# Patient Record
Sex: FEMALE | URBAN - METROPOLITAN AREA
[De-identification: names, ages, dates, MRNs, and addresses within clinical notes are randomized per-mention and may not be internally consistent; named-entity substitution may affect disease eponyms.]

---

## 2023-03-10 ENCOUNTER — MEDICAL CORRESPONDENCE (OUTPATIENT)
Dept: HEALTH INFORMATION MANAGEMENT | Facility: CLINIC | Age: 58
End: 2023-03-10
Payer: COMMERCIAL

## 2023-03-20 ENCOUNTER — TRANSCRIBE ORDERS (OUTPATIENT)
Dept: OTHER | Age: 58
End: 2023-03-20

## 2023-03-20 DIAGNOSIS — H53.451 DECREASED PERIPHERAL VISION OF RIGHT EYE: Primary | ICD-10-CM

## 2023-03-21 ENCOUNTER — TELEPHONE (OUTPATIENT)
Dept: OPHTHALMOLOGY | Facility: CLINIC | Age: 58
End: 2023-03-21
Payer: COMMERCIAL

## 2023-03-21 NOTE — TELEPHONE ENCOUNTER
Eliud and PATRICIA Romeo can make an appointment with Dr. Ramirez for pelican prism consult     Kay Romero Communication Facilitator on 3/21/2023 at 5:12 PM

## 2023-03-21 NOTE — TELEPHONE ENCOUNTER
Ok for low vision (60 minute exam) with Dr. Ramirez for Peli prism exam    Note to patient communicator to reach out for scheduling    Eduardo Bonilla RN 2:10 PM 03/21/23            [facility] Call Center    Phone Message    May a detailed message be left on voicemail: yes     Reason for Call: Appointment Intake    Referring Provider Name: Michelle Jimenez MD in GENERIC EXTERNAL DATA DEPARTMENT  Diagnosis and/or Symptoms: Neuro Ophth- Dr Ramirez for [place] Prism Lense Consult  Decreased peripheral vision of right eye [H53.451]    I wasn't sure if this was for prism testing? Protocols say to send TE others not in Protocol, James not ine Neuro-Ophthalmology     Thank you,    Action Taken: Message routed to:  [facility] Eye    Travel Screening: Not Applicable

## 2023-03-22 ENCOUNTER — TELEPHONE (OUTPATIENT)
Dept: OPHTHALMOLOGY | Facility: CLINIC | Age: 58
End: 2023-03-22
Payer: COMMERCIAL

## 2023-03-22 NOTE — TELEPHONE ENCOUNTER
Attempted to call x 2 and left 2 vm     Agueda can make an appointment with Dr. Ramirez for prism     Kay Romero Communication Facilitator on 3/22/2023 at 10:22 AM

## 2023-04-20 NOTE — TELEPHONE ENCOUNTER
Health Call Center    Phone Message    May a detailed message be left on voicemail: yes     Reason for Call: Other: Patient calling back to clarify if Dr Ramirez can see her for pelican prism. Patient wants us to kow that she already has prism glasses. Please call patient back at [phone]. Thank you.     Action Taken: Message routed to:  [facility] Eye    Travel Screening: Not Applicable

## 2023-04-21 ENCOUNTER — TELEPHONE (OUTPATIENT)
Dept: OPHTHALMOLOGY | Facility: CLINIC | Age: 58
End: 2023-04-21
Payer: COMMERCIAL

## 2023-04-21 NOTE — TELEPHONE ENCOUNTER
Spoke to pt at 1015    Pt with h/o diplopia and wears prism in glasses    Pt also states right hemiopsia and interested in peli prisms    After review offered appt with Dr. Ramirez    Scheduled with Dr. Ramirez in may in 60 minute low vision time slot    Pt aware of date/time/location/duraion/non-[employer] insurance    Pt would appreciate new patient packet    Note to  to mail out    Eduardo Bonilla RN 10:28 AM 04/21/23